# Patient Record
Sex: MALE | Race: WHITE | NOT HISPANIC OR LATINO | Employment: OTHER | URBAN - METROPOLITAN AREA
[De-identification: names, ages, dates, MRNs, and addresses within clinical notes are randomized per-mention and may not be internally consistent; named-entity substitution may affect disease eponyms.]

---

## 2020-01-08 ENCOUNTER — HOSPITAL ENCOUNTER (EMERGENCY)
Facility: HOSPITAL | Age: 65
Discharge: HOME | End: 2020-01-09
Attending: EMERGENCY MEDICINE
Payer: COMMERCIAL

## 2020-01-08 ENCOUNTER — APPOINTMENT (EMERGENCY)
Dept: RADIOLOGY | Facility: HOSPITAL | Age: 65
End: 2020-01-08
Payer: COMMERCIAL

## 2020-01-08 DIAGNOSIS — N13.2 HYDRONEPHROSIS WITH RENAL AND URETERAL CALCULOUS OBSTRUCTION: Primary | ICD-10-CM

## 2020-01-08 LAB
ALBUMIN SERPL-MCNC: 4.3 G/DL (ref 3.4–5)
ALP SERPL-CCNC: 41 IU/L (ref 35–126)
ALT SERPL-CCNC: 18 IU/L (ref 16–63)
ANION GAP SERPL CALC-SCNC: 8 MEQ/L (ref 3–15)
AST SERPL-CCNC: 23 IU/L (ref 15–41)
BACTERIA URNS QL MICRO: NORMAL /HPF
BASOPHILS # BLD: 0.05 K/UL (ref 0.01–0.1)
BASOPHILS NFR BLD: 0.4 %
BILIRUB SERPL-MCNC: 0.8 MG/DL (ref 0.3–1.2)
BILIRUB UR QL STRIP.AUTO: ABNORMAL MG/DL
BUN SERPL-MCNC: 17 MG/DL (ref 8–20)
CALCIUM SERPL-MCNC: 8.7 MG/DL (ref 8.9–10.3)
CHLORIDE SERPL-SCNC: 95 MEQ/L (ref 98–109)
CLARITY UR REFRACT.AUTO: ABNORMAL
CO2 SERPL-SCNC: 25 MEQ/L (ref 22–32)
COLOR UR AUTO: ABNORMAL
CREAT SERPL-MCNC: 1.7 MG/DL
DIFFERENTIAL METHOD BLD: ABNORMAL
EOSINOPHIL # BLD: 0.12 K/UL (ref 0.04–0.54)
EOSINOPHIL NFR BLD: 1.1 %
ERYTHROCYTE [DISTWIDTH] IN BLOOD BY AUTOMATED COUNT: 12.7 % (ref 11.6–14.4)
GFR SERPL CREATININE-BSD FRML MDRD: 41 ML/MIN/1.73M*2
GLUCOSE SERPL-MCNC: 165 MG/DL (ref 70–99)
GLUCOSE UR STRIP.AUTO-MCNC: ABNORMAL MG/DL
HCT VFR BLDCO AUTO: 43 % (ref 40.1–51)
HGB BLD-MCNC: 13.8 G/DL
HGB UR QL STRIP.AUTO: ABNORMAL
HYALINE CASTS #/AREA URNS LPF: NORMAL /LPF
IMM GRANULOCYTES # BLD AUTO: 0.06 K/UL (ref 0–0.08)
IMM GRANULOCYTES NFR BLD AUTO: 0.5 %
KETONES UR STRIP.AUTO-MCNC: ABNORMAL MG/DL
LEUKOCYTE ESTERASE UR QL STRIP.AUTO: NEGATIVE
LYMPHOCYTES # BLD: 1.16 K/UL (ref 1.2–3.5)
LYMPHOCYTES NFR BLD: 10.2 %
MCH RBC QN AUTO: 28.7 PG (ref 28–33.2)
MCHC RBC AUTO-ENTMCNC: 32.1 G/DL (ref 32.2–36.5)
MCV RBC AUTO: 89.4 FL (ref 83–98)
MONOCYTES # BLD: 0.82 K/UL (ref 0.3–1)
MONOCYTES NFR BLD: 7.2 %
NEUTROPHILS # BLD: 9.14 K/UL (ref 1.7–7)
NEUTS SEG NFR BLD: 80.6 %
NITRITE UR QL STRIP.AUTO: ABNORMAL
NRBC BLD-RTO: 0 %
PDW BLD AUTO: 9.7 FL (ref 9.4–12.4)
PH UR STRIP.AUTO: ABNORMAL [PH]
PLATELET # BLD AUTO: 239 K/UL
POTASSIUM SERPL-SCNC: 3.9 MEQ/L (ref 3.6–5.1)
PROT SERPL-MCNC: 6.7 G/DL (ref 6–8.2)
PROT UR QL STRIP.AUTO: ABNORMAL
RBC # BLD AUTO: 4.81 M/UL (ref 4.5–5.8)
RBC #/AREA URNS HPF: NORMAL /HPF
SODIUM SERPL-SCNC: 128 MEQ/L (ref 136–144)
SP GR UR REFRACT.AUTO: 1.01
SQUAMOUS URNS QL MICRO: NORMAL /HPF
UROBILINOGEN UR STRIP-ACNC: ABNORMAL EU/DL
WBC # BLD AUTO: 11.35 K/UL
WBC #/AREA URNS HPF: NORMAL /HPF

## 2020-01-08 PROCEDURE — 99284 EMERGENCY DEPT VISIT MOD MDM: CPT | Mod: 25

## 2020-01-08 PROCEDURE — 3E0337Z INTRODUCTION OF ELECTROLYTIC AND WATER BALANCE SUBSTANCE INTO PERIPHERAL VEIN, PERCUTANEOUS APPROACH: ICD-10-PCS | Performed by: EMERGENCY MEDICINE

## 2020-01-08 PROCEDURE — 96361 HYDRATE IV INFUSION ADD-ON: CPT | Mod: 59

## 2020-01-08 PROCEDURE — 85025 COMPLETE CBC W/AUTO DIFF WBC: CPT | Performed by: EMERGENCY MEDICINE

## 2020-01-08 PROCEDURE — 63600000 HC DRUGS/DETAIL CODE: Performed by: EMERGENCY MEDICINE

## 2020-01-08 PROCEDURE — 3E033NZ INTRODUCTION OF ANALGESICS, HYPNOTICS, SEDATIVES INTO PERIPHERAL VEIN, PERCUTANEOUS APPROACH: ICD-10-PCS | Performed by: EMERGENCY MEDICINE

## 2020-01-08 PROCEDURE — 80053 COMPREHEN METABOLIC PANEL: CPT | Performed by: EMERGENCY MEDICINE

## 2020-01-08 PROCEDURE — 25800000 HC PHARMACY IV SOLUTIONS: Performed by: EMERGENCY MEDICINE

## 2020-01-08 PROCEDURE — 36415 COLL VENOUS BLD VENIPUNCTURE: CPT

## 2020-01-08 PROCEDURE — 81001 URINALYSIS AUTO W/SCOPE: CPT

## 2020-01-08 PROCEDURE — 96374 THER/PROPH/DIAG INJ IV PUSH: CPT | Mod: 59

## 2020-01-08 PROCEDURE — 85025 COMPLETE CBC W/AUTO DIFF WBC: CPT

## 2020-01-08 PROCEDURE — 81001 URINALYSIS AUTO W/SCOPE: CPT | Performed by: EMERGENCY MEDICINE

## 2020-01-08 PROCEDURE — 80053 COMPREHEN METABOLIC PANEL: CPT

## 2020-01-08 PROCEDURE — 96376 TX/PRO/DX INJ SAME DRUG ADON: CPT | Mod: 59

## 2020-01-08 PROCEDURE — 74176 CT ABD & PELVIS W/O CONTRAST: CPT

## 2020-01-08 RX ORDER — MORPHINE SULFATE 4 MG/ML
4 INJECTION, SOLUTION INTRAMUSCULAR; INTRAVENOUS ONCE
Status: COMPLETED | OUTPATIENT
Start: 2020-01-08 | End: 2020-01-08

## 2020-01-08 RX ADMIN — MORPHINE SULFATE 4 MG: 4 INJECTION, SOLUTION INTRAMUSCULAR; INTRAVENOUS at 23:17

## 2020-01-08 RX ADMIN — SODIUM CHLORIDE 1000 ML: 9 INJECTION, SOLUTION INTRAVENOUS at 21:37

## 2020-01-08 RX ADMIN — MORPHINE SULFATE 4 MG: 4 INJECTION, SOLUTION INTRAMUSCULAR; INTRAVENOUS at 21:37

## 2020-01-09 VITALS
DIASTOLIC BLOOD PRESSURE: 75 MMHG | WEIGHT: 210 LBS | RESPIRATION RATE: 18 BRPM | HEART RATE: 76 BPM | OXYGEN SATURATION: 96 % | TEMPERATURE: 97.2 F | SYSTOLIC BLOOD PRESSURE: 121 MMHG | BODY MASS INDEX: 28.44 KG/M2 | HEIGHT: 72 IN

## 2020-01-09 RX ORDER — HYDROCODONE BITARTRATE AND ACETAMINOPHEN 5; 300 MG/1; MG/1
1 TABLET ORAL EVERY 6 HOURS PRN
Qty: 12 TABLET | Refills: 0 | Status: SHIPPED | OUTPATIENT
Start: 2020-01-09 | End: 2020-01-09 | Stop reason: SDUPTHER

## 2020-01-09 RX ORDER — HYDROCODONE BITARTRATE AND ACETAMINOPHEN 5; 300 MG/1; MG/1
1 TABLET ORAL EVERY 6 HOURS PRN
Qty: 12 TABLET | Refills: 0 | Status: SHIPPED | OUTPATIENT
Start: 2020-01-09 | End: 2020-01-16

## 2020-01-09 ASSESSMENT — ENCOUNTER SYMPTOMS
HEMATURIA: 0
DIARRHEA: 0
FEVER: 0
VOMITING: 0
FLANK PAIN: 1
WEAKNESS: 0
ABDOMINAL PAIN: 0
SHORTNESS OF BREATH: 0
DYSURIA: 0
COLOR CHANGE: 0
NUMBNESS: 0
CHILLS: 0
COUGH: 0
SORE THROAT: 0

## 2020-01-09 NOTE — DISCHARGE INSTRUCTIONS
You are treated in the emergency department today for a left obstructing 3 mm kidney stone.    As discussed, it is likely the stone will pass without additional intervention.    Please call your urologist office tomorrow to let them know he had a CT completed in the emergency department this evening.    You are given pain medications in the emergency department tonight, do not drive or operate machinery as these medications cause drowsiness.    You are given a small prescription for Vicodin upon request, please take this as needed for pain.    Continue taking the Flomax prescribed from the urologist today to aid passing of your stone.  Please drink plenty of oral fluids.    As discussed, you need repeat labs in a week to ensure your kidney function improves and sodium improves when you remain normal food and water intake.  Your primary care doctor can order these repeat labs.    Return to the ER for new or worsening symptoms.

## 2020-01-09 NOTE — ED PROVIDER NOTES
HPI     Chief Complaint   Patient presents with   • Flank Pain       64-year-old male with significant PMH of kidney stones presents to the ED today complaining of worsening left flank pain.  Patient notes he has been actively passing stones over a week, reports initial hematuria with passing what he suspects were two stones.  Patient has had left flank pain wax and wane for the last 7 days, he saw his urologist Dr. Palacios today and had an outpatient CT ordered.  Patient presents to the ED as the Percocet provided is not providing pain relief and his flank pain is continuing to worsen.  Patient admits to nausea without emesis.  Patient admits to decreased p.o. intake 2/2 nausea.  Patient denies fever/chills, chest pain, shortness of breath, abdominal pain, diarrhea, blood in his stool.           Patient History     Past Medical History:   Diagnosis Date   • Kidney stones        Past Surgical History:   Procedure Laterality Date   • HERNIA REPAIR     • LITHOTRIPSY         History reviewed. No pertinent family history.    Social History     Tobacco Use   • Smoking status: Never Smoker   • Smokeless tobacco: Never Used   Substance Use Topics   • Alcohol use: Yes     Comment: occasional    • Drug use: Never       Systems Reviewed from Nursing Triage:          Review of Systems     Review of Systems   Constitutional: Negative for chills and fever.   HENT: Negative for sore throat.    Respiratory: Negative for cough and shortness of breath.    Cardiovascular: Negative for chest pain.   Gastrointestinal: Negative for abdominal pain, diarrhea and vomiting.   Genitourinary: Positive for flank pain (Left). Negative for dysuria and hematuria.   Skin: Negative for color change.   Neurological: Negative for weakness and numbness.        Physical Exam     ED Triage Vitals [01/08/20 2104]   Temp Heart Rate Resp BP SpO2   36.2 °C (97.2 °F) 82 18 (!) 162/87 99 %      Temp Source Heart Rate Source Patient Position BP Location FiO2  (%) (Set)   Temporal -- Sitting Right upper arm --                     Patient Vitals for the past 24 hrs:   BP Temp Temp src Pulse Resp SpO2 Height Weight   01/08/20 2104 (!) 162/87 36.2 °C (97.2 °F) Temporal 82 18 99 % 1.829 m (6') 95.3 kg (210 lb)                                       Physical Exam   Constitutional: He appears well-developed and well-nourished.   appears non-toxic and well   HENT:   Head: Normocephalic and atraumatic.   Eyes: Conjunctivae are normal.   Neck: Neck supple.   Cardiovascular: Normal rate and regular rhythm.   No murmur heard.  Pulmonary/Chest: Effort normal and breath sounds normal. No respiratory distress.   Abdominal: Soft. There is no tenderness.   Soft, nondistended  No tenderness in any quadrant   Musculoskeletal: He exhibits tenderness. He exhibits no edema.   Left sided CVA tenderness   Neurological: He is alert.   Skin: Skin is warm and dry.   Psychiatric: He has a normal mood and affect.   Nursing note and vitals reviewed.           Procedures    ED Course & MDM     Labs Reviewed   CBC AND DIFFERENTIAL    Narrative:     The following orders were created for panel order CBC and differential.  Procedure                               Abnormality         Status                     ---------                               -----------         ------                     CBC[[id number]]                                                In process                 Diff Count[[id number]]                                         In process                   Please view results for these tests on the individual orders.   COMPREHENSIVE METABOLIC PANEL   URINALYSIS REFLEX CULTURE (ED AND OUTPATIENT ONLY)    Narrative:     The following orders were created for panel order Urinalysis w/ reflex culture.  Procedure                               Abnormality         Status                     ---------                               -----------         ------                     UA Reflex to Culture  (Macr...[[id number]]                      In process                   Please view results for these tests on the individual orders.   RAINBOW DRAW PANEL    Narrative:     The following orders were created for panel order [place] Draw Panel.  Procedure                               Abnormality         Status                     ---------                               -----------         ------                     RAINBOW RED[[id number]]                                        In process                 RAINBOW LT BLUE[[id number]]                                    In process                 RAINBOW LT GREEN[[id number]]                                   In process                 RAINBOW GOLD[[id number]]                                       In process                   Please view results for these tests on the individual orders.   CBC   DIFF COUNT   UA REFLEX CULTURE (MACROSCOPIC)   RAINBOW RED   RAINBOW LT BLUE   RAINBOW LT GREEN   RAINBOW GOLD       CT ABDOMEN PELVIS WITHOUT IV CONTRAST    (Results Pending)               [facility]         ED Course as of Jan 09 2022   Wed Jan 08, 2020 2112 Impression: Worsening left flank pain with associated nausea, known kidney stonesPlan: IV, labs, CT abdomen without contrast, IV fluids and pain meds; observeDiscussed patient plan of care with attending    [BB]   2201 Sodium(!): 128 [BB]   2201 Creatinine(!): 1.7 [BB]   2201 eGFR(!): 41.0 [BB]   2201 Patient back in ED bed, IV fluids running and pain medicine given    [BB]   2331 CT Abd/Pelvis Remote Radiology   IMPRESSION:    1. Mild left hydronephrosis and hydroureter with obstructing 3 mm stone at the left UVJ.   2. Diverticulosis.   3. Hiatal hernia.    [BB]   2348 Discussed CT results in detail with patient.Patient reports that his pain is controlled at this time pain medication, he reports that the Percocet that was given to him today by Dr. Palacios does not work well, reports Vicodin works better for him, will provide small prescription  upon request.He will call [facility] office tomorrow to let him know he had CT completed for follow-up plan of care.    [BB]      ED Course User Index  [BB] Priscila Sandhu PA C         Clinical Impressions as of Jan 09 2022   Hydronephrosis with renal and ureteral calculous obstruction        Priscila Sandhu PA C  01/09/20 2027

## 2020-01-09 NOTE — RESULT ENCOUNTER NOTE
MESSAGE LEFT ON CELL ([phone]) TO CALL ED RELATED TO [facility]- NEED TO FOLLOW THIS BY PMD

## 2020-01-10 NOTE — ED ATTESTATION NOTE
The patient was evaluated and managed by the physician assistant / nurse practitioner. Discussed complaint, exam and results with pa/np and agree with assessment and plan. I was available to see the pt at the request of the pa/np or pt request.      Price Drummond MD  01/09/20 8147

## 2020-01-30 ENCOUNTER — TRANSCRIBE ORDERS (OUTPATIENT)
Dept: SCHEDULING | Age: 65
End: 2020-01-30

## 2020-01-30 DIAGNOSIS — N20.0 CALCULUS OF KIDNEY: Primary | ICD-10-CM

## 2020-02-06 ENCOUNTER — HOSPITAL ENCOUNTER (OUTPATIENT)
Dept: RADIOLOGY | Age: 65
Discharge: HOME | End: 2020-02-06
Attending: UROLOGY
Payer: COMMERCIAL

## 2020-02-06 DIAGNOSIS — N20.0 CALCULUS OF KIDNEY: ICD-10-CM

## 2020-02-06 PROCEDURE — 76775 US EXAM ABDO BACK WALL LIM: CPT

## 2024-08-26 NOTE — PROGRESS NOTES
"Ambulatory Visit  Name: Nixon Norman      : 1955      MRN: [id number]  Encounter Provider: Neil Johnson DO  Encounter Date: 9/3/2024   Encounter department: [facility] PARTNERSHIP WITH [facility]    Assessment & Plan   1. Non-recurrent inguinal hernia without obstruction or gangrene, unspecified laterality         Patient is a 69-year-old male who is presenting today for concerns of a hernia.    History of Present Illness   {Disappearing Hyperlinks I Encounters * My Last Note * Since Last Visit * History :52042}  HPI    Review of Systems   Constitutional:  Negative for fever.   Respiratory:  Negative for shortness of breath.    Cardiovascular:  Negative for chest pain.   Gastrointestinal:  Negative for abdominal pain, constipation and diarrhea.        Hernia   Genitourinary:  Negative for difficulty urinating.   Skin:  Negative for rash.      Nixon Norman is an 69 y.o. male who was referred for evaluation of a  {:58755}. Symptoms were first noted {numbers; 0-10:00159} {:70886} ago. Symptoms or injury {did/did not:35520} occur at work. Pain is {hernia sx:37129} and {is/is not:9024} reducible. The patient has no symptoms of  {hernia etiology:55419::\"chronic constipation\",\"chronic cough\",\"difficulty urinating\"}. There {hpi assoc has/has/not:88309} previous hx of groin surgery.    Objective   {Disappearing Hyperlinks   Review Vitals * Enter New Vitals * Results Review * Labs * Imaging * Cardiology * Procedures * Lung Cancer Screening :57320}  There were no vitals taken for this visit.    Physical Exam  Constitutional:       General: He is not in acute distress.     Appearance: Normal appearance.   HENT:      Head: Normocephalic and atraumatic.      Right Ear: External ear normal.      Left Ear: External ear normal.      Nose: Nose normal.      Mouth/Throat:      Mouth: Mucous membranes are moist.      Pharynx: Oropharynx is clear.   Eyes:      Extraocular " Movements: Extraocular movements intact.      Conjunctiva/sclera: Conjunctivae normal.      Pupils: Pupils are equal, round, and reactive to light.   Cardiovascular:      Rate and Rhythm: Normal rate and regular rhythm.      Heart sounds: Normal heart sounds. No murmur heard.     No friction rub. No gallop.   Pulmonary:      Effort: Pulmonary effort is normal. No respiratory distress.      Breath sounds: Normal breath sounds. No wheezing.   Abdominal:      General: Abdomen is flat.      Palpations: Abdomen is soft.      Hernia: A hernia is present.   Musculoskeletal:         General: Normal range of motion.      Cervical back: Normal range of motion and neck supple.   Skin:     General: Skin is warm and dry.      Findings: No erythema or rash.   Neurological:      General: No focal deficit present.      Mental Status: He is alert and oriented to person, place, and time. Mental status is at baseline.   Psychiatric:         Mood and Affect: Mood normal.         Behavior: Behavior normal.         Thought Content: Thought content normal.         Judgment: Judgment normal.       Administrative Statements {Disappearing Hyperlinks I  Level of Service * [facility]}  I have spent a total time of 20 minutes in caring for this patient on the day of the visit/encounter including Instructions for management, Documenting in the medical record, and Obtaining or reviewing history  .

## 2024-09-03 ENCOUNTER — OFFICE VISIT (OUTPATIENT)
Dept: FAMILY MEDICINE CLINIC | Facility: CLINIC | Age: 69
End: 2024-09-03

## 2024-09-03 VITALS
OXYGEN SATURATION: 98 % | HEART RATE: 64 BPM | WEIGHT: 210 LBS | BODY MASS INDEX: 28.44 KG/M2 | DIASTOLIC BLOOD PRESSURE: 88 MMHG | HEIGHT: 72 IN | SYSTOLIC BLOOD PRESSURE: 138 MMHG | TEMPERATURE: 98.3 F

## 2024-09-03 DIAGNOSIS — Z23 ENCOUNTER FOR VACCINATION: ICD-10-CM
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
## 2024-09-03 DIAGNOSIS — R20.0 NUMBNESS AND TINGLING SENSATION OF SKIN: ICD-10-CM

## 2024-09-03 DIAGNOSIS — R20.2 NUMBNESS AND TINGLING SENSATION OF SKIN: ICD-10-CM

## 2024-09-03 DIAGNOSIS — Z00.00 ANNUAL PHYSICAL EXAM: Primary | ICD-10-CM

## 2024-09-03 DIAGNOSIS — Z12.5 PROSTATE CANCER SCREENING: ICD-10-CM

## 2024-09-03 DIAGNOSIS — Z12.11 COLON CANCER SCREENING: ICD-10-CM

## 2024-09-03 PROBLEM — Z87.442 HISTORY OF KIDNEY STONES: Status: ACTIVE | Noted: 2024-09-03

## 2024-09-03 PROBLEM — Z86.19 HISTORY OF LYME DISEASE: Status: ACTIVE | Noted: 2024-09-03

## 2024-09-03 PROCEDURE — 99213 OFFICE O/P EST LOW 20 MIN: CPT | Performed by: STUDENT IN AN ORGANIZED HEALTH CARE EDUCATION/TRAINING PROGRAM

## 2024-09-03 PROCEDURE — 99397 PER PM REEVAL EST PAT 65+ YR: CPT | Performed by: STUDENT IN AN ORGANIZED HEALTH CARE EDUCATION/TRAINING PROGRAM

## 2024-09-03 NOTE — ASSESSMENT & PLAN NOTE
Noted to left groin area.  Patient had no back pain or radicular symptoms on exam today.  I suspect that his tingling sensation is due to a nerve impingement more in the groin area from where he did the split.  I will give him pelvic floor exercises along with some back exercises.  I recommended stretching and ice as needed.  If symptoms do not improve after a month I we will have patient do an x-ray of the lumbar spine.

## 2024-09-03 NOTE — PROGRESS NOTES
Adult Annual Physical  Name: Nixon Norman      : 1955      MRN: [id number]  Encounter Provider: Neil Johnson DO  Encounter Date: 9/3/2024   Encounter department: [facility]SHIP WITH [facility]    Assessment & Plan   1. Annual physical exam  -     CBC and differential; Future; Expected date: 2024  -     Comprehensive metabolic panel; Future; Expected date: 2024  -     Lipid Panel with Direct LDL reflex; Future; Expected date: 2024  -     CBC and differential  -     Comprehensive metabolic panel  -     Lipid Panel with Direct LDL reflex  2. Encounter for vaccination  Comments:  No vaccines given today.  3. Colon cancer screening  -     Ambulatory referral to Gastroenterology; Future  4. Prostate cancer screening  -     PSA, Total Screen; Future; Expected date: 2024  -     PSA, Total Screen  5. Numbness and tingling sensation of skin  Assessment & Plan:  Noted to left groin area.  Patient had no back pain or radicular symptoms on exam today.  I suspect that his tingling sensation is due to a nerve impingement more in the groin area from where he did the split.  I will give him pelvic floor exercises along with some back exercises.  I recommended stretching and ice as needed.  If symptoms do not improve after a month I we will have patient do an x-ray of the lumbar spine.   6. Fall, initial encounter  -     XR spine lumbar 2 or 3 views injury; Future; Expected date: 10/03/2024    Immunizations and preventive care screenings were discussed with patient today. Appropriate education was printed on patient's after visit summary.    Counseling:  Dental Health: discussed importance of regular tooth brushing, flossing, and dental visits.  Exercise: the importance of regular exercise/physical activity was discussed. Recommend exercise 3-5 times per week for at least 30 minutes.       Depression Screening and Follow-up Plan: Patient was  screened for depression during today's encounter. They screened negative with a PHQ-2 score of 0.    Falls Plan of Care: Assessed feet and footwear.         History of Present Illness     Adult Annual Physical:  Patient presents for annual physical.     Diet and Physical Activity:  - Diet/Nutrition:. doesn't eat gluten; meat and veggies  - Exercise: walking and moderate cardiovascular exercise.    Depression Screening:  - PHQ-2 Score: 0    General Health:  - Sleep: sleeps well.  - Hearing: normal hearing bilateral ears.  - Vision: wears glasses.  - Dental: regular dental visits.    Review of Systems   Constitutional:  Negative for fever.   Respiratory:  Negative for shortness of breath.    Cardiovascular:  Negative for chest pain.   Gastrointestinal:  Negative for abdominal pain, constipation and diarrhea.   Genitourinary:  Negative for difficulty urinating.   Skin:  Negative for rash.   Neurological:  Positive for numbness.       Nixon Norman is an 69 y.o. male who was referred for evaluation of a left groin numbness. Symptoms were first noted 1 month ago. Symptoms or injury did not occur at work. Pain is radiating to groin. The patient has no symptoms of  chronic constipation. There has previous hx of groin surgery.    Patient reports that this started a month ago when he slipped on wet concrete.  He did a split and since then he has noticed pain low in the groin area near where his leg meets the pubic area instead of being an inguinal numbness.  He has not noticed any area popping out like a hernia.  He has noticed the area has been more swollen looking than the other side.    Objective     /88   Pulse 64   Temp 98.3 °F (36.8 °C)   Ht 6' (1.829 m)   Wt 95.3 kg (210 lb)   SpO2 98%   BMI 28.48 kg/m²     Physical Exam  Constitutional:       General: He is not in acute distress.     Appearance: Normal appearance.   HENT:      Head: Normocephalic and atraumatic.      Right Ear: Tympanic membrane, ear canal  and external ear normal.      Left Ear: Tympanic membrane, ear canal and external ear normal.      Nose: Nose normal. No congestion.      Mouth/Throat:      Mouth: Mucous membranes are moist.      Pharynx: Oropharynx is clear. No oropharyngeal exudate or posterior oropharyngeal erythema.   Eyes:      Extraocular Movements: Extraocular movements intact.      Conjunctiva/sclera: Conjunctivae normal.      Pupils: Pupils are equal, round, and reactive to light.   Cardiovascular:      Rate and Rhythm: Normal rate and regular rhythm.      Heart sounds: Normal heart sounds. No murmur heard.     No friction rub. No gallop.   Pulmonary:      Effort: Pulmonary effort is normal. No respiratory distress.      Breath sounds: Normal breath sounds. No wheezing.   Abdominal:      General: Abdomen is flat.      Palpations: Abdomen is soft.      Tenderness: There is no abdominal tenderness. There is no guarding.      Hernia: No hernia is present.   Musculoskeletal:         General: No tenderness. Normal range of motion.      Cervical back: Normal range of motion and neck supple.   Lymphadenopathy:      Cervical: No cervical adenopathy.   Skin:     General: Skin is warm and dry.      Findings: No erythema or rash.   Neurological:      General: No focal deficit present.      Mental Status: He is alert and oriented to person, place, and time. Mental status is at baseline.   Psychiatric:         Mood and Affect: Mood normal.         Behavior: Behavior normal.         Thought Content: Thought content normal.         Judgment: Judgment normal.

## 2024-09-06 LAB
ALBUMIN SERPL-MCNC: 4.5 G/DL (ref 3.6–5.1)
ALBUMIN/GLOB SERPL: 2 (CALC) (ref 1–2.5)
ALP SERPL-CCNC: 56 U/L (ref 35–144)
ALT SERPL-CCNC: 23 U/L (ref 9–46)
AST SERPL-CCNC: 25 U/L (ref 10–35)
BASOPHILS # BLD AUTO: 38 CELLS/UL (ref 0–200)
BASOPHILS NFR BLD AUTO: 0.5 %
BILIRUB SERPL-MCNC: 0.7 MG/DL (ref 0.2–1.2)
BUN SERPL-MCNC: 20 MG/DL (ref 7–25)
BUN/CREAT SERPL: ABNORMAL (CALC) (ref 6–22)
CALCIUM SERPL-MCNC: 9.6 MG/DL (ref 8.6–10.3)
CHLORIDE SERPL-SCNC: 103 MMOL/L (ref 98–110)
CHOLEST SERPL-MCNC: 201 MG/DL
CHOLEST/HDLC SERPL: 3 (CALC)
CO2 SERPL-SCNC: 30 MMOL/L (ref 20–32)
CREAT SERPL-MCNC: 0.9 MG/DL (ref 0.7–1.35)
EOSINOPHIL # BLD AUTO: 137 CELLS/UL (ref 15–500)
EOSINOPHIL NFR BLD AUTO: 1.8 %
ERYTHROCYTE [DISTWIDTH] IN BLOOD BY AUTOMATED COUNT: 12.5 % (ref 11–15)
GFR/BSA.PRED SERPLBLD CYS-BASED-ARV: 94 ML/MIN/1.73M2
GLOBULIN SER CALC-MCNC: 2.2 G/DL (CALC) (ref 1.9–3.7)
GLUCOSE SERPL-MCNC: 116 MG/DL (ref 65–99)
HCT VFR BLD AUTO: 49.2 % (ref 38.5–50)
HDLC SERPL-MCNC: 68 MG/DL
HGB BLD-MCNC: 16.2 G/DL (ref 13.2–17.1)
LDLC SERPL CALC-MCNC: 113 MG/DL (CALC)
LYMPHOCYTES # BLD AUTO: 1687 CELLS/UL (ref 850–3900)
LYMPHOCYTES NFR BLD AUTO: 22.2 %
MCH RBC QN AUTO: 29.1 PG (ref 27–33)
MCHC RBC AUTO-ENTMCNC: 32.9 G/DL (ref 32–36)
MCV RBC AUTO: 88.5 FL (ref 80–100)
MONOCYTES # BLD AUTO: 524 CELLS/UL (ref 200–950)
MONOCYTES NFR BLD AUTO: 6.9 %
NEUTROPHILS # BLD AUTO: 5214 CELLS/UL (ref 1500–7800)
NEUTROPHILS NFR BLD AUTO: 68.6 %
NONHDLC SERPL-MCNC: 133 MG/DL (CALC)
PLATELET # BLD AUTO: 261 THOUSAND/UL (ref 140–400)
PMV BLD REES-ECKER: 10.2 FL (ref 7.5–12.5)
POTASSIUM SERPL-SCNC: 4.6 MMOL/L (ref 3.5–5.3)
PROT SERPL-MCNC: 6.7 G/DL (ref 6.1–8.1)
PSA SERPL-MCNC: 3.53 NG/ML
RBC # BLD AUTO: 5.56 MILLION/UL (ref 4.2–5.8)
SODIUM SERPL-SCNC: 139 MMOL/L (ref 135–146)
TRIGL SERPL-MCNC: 92 MG/DL
WBC # BLD AUTO: 7.6 THOUSAND/UL (ref 3.8–10.8)

## 2024-09-07 DIAGNOSIS — E78.00 HIGH CHOLESTEROL: Primary | ICD-10-CM

## 2024-09-27 ENCOUNTER — TELEMEDICINE (OUTPATIENT)
Dept: FAMILY MEDICINE CLINIC | Facility: CLINIC | Age: 69
End: 2024-09-27

## 2024-09-27 DIAGNOSIS — K40.90 INGUINAL HERNIA, LEFT: Primary | ICD-10-CM

## 2024-09-27 DIAGNOSIS — R20.0 NUMBNESS AND TINGLING SENSATION OF SKIN: ICD-10-CM

## 2024-09-27 DIAGNOSIS — R20.2 NUMBNESS AND TINGLING SENSATION OF SKIN: ICD-10-CM

## 2024-09-27 PROCEDURE — 99213 OFFICE O/P EST LOW 20 MIN: CPT | Performed by: STUDENT IN AN ORGANIZED HEALTH CARE EDUCATION/TRAINING PROGRAM

## 2024-09-27 RX ORDER — METHYLPREDNISOLONE 4 MG
TABLET, DOSE PACK ORAL
Qty: 21 EACH | Refills: 0 | Status: CANCELLED | OUTPATIENT
Start: 2024-09-27

## 2024-09-27 NOTE — PROGRESS NOTES
Virtual Regular Visit  Name: Nixon Norman      : 1955      MRN: [id number]  Encounter Provider: Neil Johnson DO  Encounter Date: 2024   Encounter department: [facility]SHIP [facility]    Verification of patient location:    Patient is located at Other in the following state in which I hold an active license [place]    Assessment & Plan  Inguinal hernia, left    Orders:    Ambulatory Referral to General Surgery; Future    Numbness and tingling sensation of skin    Orders:    Ambulatory Referral to General Surgery; Future         Patient is a 69-year-old male presenting today for follow-up on groin pressure and numbness and tingling of the skin.  This started several weeks ago when patient did a split and strained the groin area.  The area has changed and now he is noticing a visible hernia in the area of concern.  Patient states that it is causing pain and making it difficult to do his regular activities.  Instead of following up with imaging I will refer him right away to a general surgeon for evaluation.  He used to see Dr. Peoples who did his right hernia repair.  I will refer over to Dr. Peoples's office in [place] at patients request.    Encounter provider Neil Johsnon DO    The patient was identified by name and date of birth. Nixon Norman was informed that this is a telemedicine visit and that the visit is being conducted through the Epic Embedded platform. He agrees to proceed..  My office door was closed. No one else was in the room.  He acknowledged consent and understanding of privacy and security of the video platform. The patient has agreed to participate and understands they can discontinue the visit at any time.    Patient is aware this is a billable service.     History of Present Illness     HPI    History obtained from : patient  Review of Systems   Constitutional:  Negative for fever.   Respiratory:  Negative for shortness of breath.     Cardiovascular:  Negative for chest pain.   Gastrointestinal:  Negative for abdominal pain.   Genitourinary:         Pelvic pressure/pain   Skin:  Negative for rash.   Neurological:  Positive for numbness.     Patient is a 69-year-old male presenting today for follow-up on pelvic pressure with numbness and tingling sensation of the skin.  This initially occurred after an incident where patient did a split.  He states that he recently had his colonoscopy and afterwards started to get a bulge in the area of the left groin.  It started causing pain and he notices when he lifts objects or bears down the prominence gets worse.  This feels identical to when he had the hernia on the right side of his groin.      Objective     There were no vitals taken for this visit.  Physical Exam  Constitutional:       General: He is not in acute distress.     Appearance: Normal appearance.   HENT:      Head: Normocephalic and atraumatic.      Right Ear: External ear normal.      Left Ear: External ear normal.      Nose: Nose normal.   Eyes:      Pupils: Pupils are equal, round, and reactive to light.   Pulmonary:      Effort: Pulmonary effort is normal. No respiratory distress.   Musculoskeletal:         General: Normal range of motion.      Cervical back: Normal range of motion and neck supple.   Skin:     Findings: No erythema or rash.   Neurological:      General: No focal deficit present.      Mental Status: He is alert and oriented to person, place, and time. Mental status is at baseline.   Psychiatric:         Mood and Affect: Mood normal.         Behavior: Behavior normal.         Thought Content: Thought content normal.         Judgment: Judgment normal.         Visit Time  Total Visit Duration: 15

## 2025-05-06 ENCOUNTER — TELEPHONE (OUTPATIENT)
Dept: ADMINISTRATIVE | Facility: OTHER | Age: 70
End: 2025-05-06

## 2025-05-06 NOTE — TELEPHONE ENCOUNTER
----- Message from Silviano JACKSON sent at 5/6/2025  6:49 AM EDT -----  Regarding: Care Gap Request-colonoscopy  05/06/25 6:49 AM    Hello, our patient has had CRC: Colonoscopy completed/performed. Please assist in updating the patient chart by making an External outreach to [facility]. Facility located in [address] [address], [place] [address] Phone: [phone]. Exact date is unknown but patient had it done within the past 10 years.    Thank you,  MONSE Alains [facility]

## 2025-05-12 NOTE — TELEPHONE ENCOUNTER
Upon review of the In Basket request and the patient's chart, initial outreach has been made via fax to facility. Please see Contacts section for details.     Thank you  Adeline Galloway

## 2025-05-13 NOTE — TELEPHONE ENCOUNTER
Upon review of the In Basket request we were able to locate, review, and update the patient chart as requested for CRC: Colonoscopy.    Any additional questions or concerns should be emailed to the Practice Liaisons via the appropriate education email address, please do not reply via In Basket.    Thank you  Adeline Galloway   [facility]

## 2025-05-13 NOTE — TELEPHONE ENCOUNTER
05/13/25 12:19 PM     Chart reviewed for CRC: Colonoscopy was/were submitted to the patient's insurance.     Adeline Galloway   [facility]